# Patient Record
Sex: MALE | Race: WHITE | Employment: UNEMPLOYED | URBAN - METROPOLITAN AREA
[De-identification: names, ages, dates, MRNs, and addresses within clinical notes are randomized per-mention and may not be internally consistent; named-entity substitution may affect disease eponyms.]

---

## 2020-10-14 ENCOUNTER — ANESTHESIA EVENT (OUTPATIENT)
Dept: MEDSURG UNIT | Age: 40
End: 2020-10-14
Payer: SELF-PAY

## 2020-10-15 ENCOUNTER — TRANSCRIBE ORDER (OUTPATIENT)
Dept: REGISTRATION | Age: 40
End: 2020-10-15

## 2020-10-15 ENCOUNTER — HOSPITAL ENCOUNTER (OUTPATIENT)
Dept: PREADMISSION TESTING | Age: 40
Discharge: HOME | End: 2020-10-15
Payer: SELF-PAY

## 2020-10-15 DIAGNOSIS — Z01.812 PRE-PROCEDURE LAB EXAM: Primary | ICD-10-CM

## 2020-10-15 DIAGNOSIS — Z01.812 PRE-PROCEDURE LAB EXAM: ICD-10-CM

## 2020-10-15 PROCEDURE — 87635 SARS-COV-2 COVID-19 AMP PRB: CPT

## 2020-10-16 LAB — SARS-COV-2, COV2NT: NOT DETECTED

## 2020-10-19 ENCOUNTER — HOSPITAL ENCOUNTER (OUTPATIENT)
Age: 40
Setting detail: OUTPATIENT SURGERY
Discharge: HOME | End: 2020-10-19
Attending: PLASTIC SURGERY | Admitting: PLASTIC SURGERY
Payer: SELF-PAY

## 2020-10-19 ENCOUNTER — ANESTHESIA (OUTPATIENT)
Dept: MEDSURG UNIT | Age: 40
End: 2020-10-19
Payer: SELF-PAY

## 2020-10-19 VITALS
HEART RATE: 54 BPM | RESPIRATION RATE: 14 BRPM | OXYGEN SATURATION: 94 % | WEIGHT: 197 LBS | TEMPERATURE: 97.6 F | SYSTOLIC BLOOD PRESSURE: 124 MMHG | DIASTOLIC BLOOD PRESSURE: 64 MMHG | BODY MASS INDEX: 28.2 KG/M2 | HEIGHT: 70 IN

## 2020-10-19 PROCEDURE — 74011250637 HC RX REV CODE- 250/637: Performed by: ANESTHESIOLOGY

## 2020-10-19 PROCEDURE — 76030000006 HC AMB SURG OR TIME 3 TO 3.5: Performed by: PLASTIC SURGERY

## 2020-10-19 PROCEDURE — 77030008684 HC TU ET CUF COVD -B: Performed by: ANESTHESIOLOGY

## 2020-10-19 PROCEDURE — 76060000066 HC AMB SURG ANES 3 TO 3.5 HR: Performed by: PLASTIC SURGERY

## 2020-10-19 PROCEDURE — 77030040922 HC BLNKT HYPOTHRM STRY -A

## 2020-10-19 PROCEDURE — 2709999900 HC NON-CHARGEABLE SUPPLY

## 2020-10-19 PROCEDURE — 77030002916 HC SUT ETHLN J&J -A: Performed by: PLASTIC SURGERY

## 2020-10-19 PROCEDURE — 74011250636 HC RX REV CODE- 250/636: Performed by: PLASTIC SURGERY

## 2020-10-19 PROCEDURE — 77030040361 HC SLV COMPR DVT MDII -B: Performed by: PLASTIC SURGERY

## 2020-10-19 PROCEDURE — 77030018836 HC SOL IRR NACL ICUM -A: Performed by: PLASTIC SURGERY

## 2020-10-19 PROCEDURE — 74011000258 HC RX REV CODE- 258: Performed by: NURSE ANESTHETIST, CERTIFIED REGISTERED

## 2020-10-19 PROCEDURE — 74011250636 HC RX REV CODE- 250/636: Performed by: NURSE ANESTHETIST, CERTIFIED REGISTERED

## 2020-10-19 PROCEDURE — 77030040830 HC CATH URETH FOL MDII -A: Performed by: PLASTIC SURGERY

## 2020-10-19 PROCEDURE — 76210000036 HC AMBSU PH I REC 1.5 TO 2 HR: Performed by: PLASTIC SURGERY

## 2020-10-19 PROCEDURE — 77030008538 HC TBNG LIPO SUC WELL -B: Performed by: PLASTIC SURGERY

## 2020-10-19 PROCEDURE — 77030008534 HC TBNG LIPOSUC BYRO -B: Performed by: PLASTIC SURGERY

## 2020-10-19 PROCEDURE — 74011000250 HC RX REV CODE- 250: Performed by: PLASTIC SURGERY

## 2020-10-19 PROCEDURE — 77030026438 HC STYL ET INTUB CARD -A: Performed by: ANESTHESIOLOGY

## 2020-10-19 PROCEDURE — 74011250636 HC RX REV CODE- 250/636: Performed by: ANESTHESIOLOGY

## 2020-10-19 PROCEDURE — 2709999900 HC NON-CHARGEABLE SUPPLY: Performed by: PLASTIC SURGERY

## 2020-10-19 PROCEDURE — 74011000250 HC RX REV CODE- 250: Performed by: NURSE ANESTHETIST, CERTIFIED REGISTERED

## 2020-10-19 PROCEDURE — 77030012406 HC DRN WND PENRS BARD -A: Performed by: PLASTIC SURGERY

## 2020-10-19 RX ORDER — SCOLOPAMINE TRANSDERMAL SYSTEM 1 MG/1
1 PATCH, EXTENDED RELEASE TRANSDERMAL ONCE
Status: DISCONTINUED | OUTPATIENT
Start: 2020-10-19 | End: 2020-10-19 | Stop reason: HOSPADM

## 2020-10-19 RX ORDER — NEBIVOLOL 2.5 MG/1
2.5 TABLET ORAL DAILY
COMMUNITY

## 2020-10-19 RX ORDER — PROPOFOL 10 MG/ML
INJECTION, EMULSION INTRAVENOUS AS NEEDED
Status: DISCONTINUED | OUTPATIENT
Start: 2020-10-19 | End: 2020-10-19 | Stop reason: HOSPADM

## 2020-10-19 RX ORDER — FENTANYL CITRATE 50 UG/ML
INJECTION, SOLUTION INTRAMUSCULAR; INTRAVENOUS AS NEEDED
Status: DISCONTINUED | OUTPATIENT
Start: 2020-10-19 | End: 2020-10-19 | Stop reason: HOSPADM

## 2020-10-19 RX ORDER — OXYCODONE HYDROCHLORIDE 5 MG/1
5 TABLET ORAL AS NEEDED
Status: DISCONTINUED | OUTPATIENT
Start: 2020-10-19 | End: 2020-10-19 | Stop reason: HOSPADM

## 2020-10-19 RX ORDER — DIPHENHYDRAMINE HYDROCHLORIDE 50 MG/ML
12.5 INJECTION, SOLUTION INTRAMUSCULAR; INTRAVENOUS AS NEEDED
Status: DISCONTINUED | OUTPATIENT
Start: 2020-10-19 | End: 2020-10-19 | Stop reason: HOSPADM

## 2020-10-19 RX ORDER — SODIUM CHLORIDE, SODIUM LACTATE, POTASSIUM CHLORIDE, CALCIUM CHLORIDE 600; 310; 30; 20 MG/100ML; MG/100ML; MG/100ML; MG/100ML
100 INJECTION, SOLUTION INTRAVENOUS CONTINUOUS
Status: DISCONTINUED | OUTPATIENT
Start: 2020-10-19 | End: 2020-10-19 | Stop reason: HOSPADM

## 2020-10-19 RX ORDER — DEXAMETHASONE SODIUM PHOSPHATE 4 MG/ML
INJECTION, SOLUTION INTRA-ARTICULAR; INTRALESIONAL; INTRAMUSCULAR; INTRAVENOUS; SOFT TISSUE AS NEEDED
Status: DISCONTINUED | OUTPATIENT
Start: 2020-10-19 | End: 2020-10-19 | Stop reason: HOSPADM

## 2020-10-19 RX ORDER — ROCURONIUM BROMIDE 10 MG/ML
INJECTION, SOLUTION INTRAVENOUS AS NEEDED
Status: DISCONTINUED | OUTPATIENT
Start: 2020-10-19 | End: 2020-10-19 | Stop reason: HOSPADM

## 2020-10-19 RX ORDER — MIDAZOLAM HYDROCHLORIDE 1 MG/ML
1 INJECTION, SOLUTION INTRAMUSCULAR; INTRAVENOUS AS NEEDED
Status: DISCONTINUED | OUTPATIENT
Start: 2020-10-19 | End: 2020-10-19 | Stop reason: HOSPADM

## 2020-10-19 RX ORDER — SODIUM CHLORIDE 0.9 % (FLUSH) 0.9 %
5-40 SYRINGE (ML) INJECTION EVERY 8 HOURS
Status: DISCONTINUED | OUTPATIENT
Start: 2020-10-19 | End: 2020-10-19 | Stop reason: HOSPADM

## 2020-10-19 RX ORDER — MIDAZOLAM HYDROCHLORIDE 1 MG/ML
INJECTION, SOLUTION INTRAMUSCULAR; INTRAVENOUS AS NEEDED
Status: DISCONTINUED | OUTPATIENT
Start: 2020-10-19 | End: 2020-10-19 | Stop reason: HOSPADM

## 2020-10-19 RX ORDER — ONDANSETRON 2 MG/ML
INJECTION INTRAMUSCULAR; INTRAVENOUS AS NEEDED
Status: DISCONTINUED | OUTPATIENT
Start: 2020-10-19 | End: 2020-10-19 | Stop reason: HOSPADM

## 2020-10-19 RX ORDER — LIDOCAINE HYDROCHLORIDE 20 MG/ML
INJECTION, SOLUTION EPIDURAL; INFILTRATION; INTRACAUDAL; PERINEURAL AS NEEDED
Status: DISCONTINUED | OUTPATIENT
Start: 2020-10-19 | End: 2020-10-19 | Stop reason: HOSPADM

## 2020-10-19 RX ORDER — MORPHINE SULFATE 10 MG/ML
2 INJECTION, SOLUTION INTRAMUSCULAR; INTRAVENOUS
Status: DISCONTINUED | OUTPATIENT
Start: 2020-10-19 | End: 2020-10-19 | Stop reason: HOSPADM

## 2020-10-19 RX ORDER — FENTANYL CITRATE 50 UG/ML
50 INJECTION, SOLUTION INTRAMUSCULAR; INTRAVENOUS AS NEEDED
Status: DISCONTINUED | OUTPATIENT
Start: 2020-10-19 | End: 2020-10-19 | Stop reason: HOSPADM

## 2020-10-19 RX ORDER — SODIUM CHLORIDE, SODIUM LACTATE, POTASSIUM CHLORIDE, CALCIUM CHLORIDE 600; 310; 30; 20 MG/100ML; MG/100ML; MG/100ML; MG/100ML
25 INJECTION, SOLUTION INTRAVENOUS CONTINUOUS
Status: DISCONTINUED | OUTPATIENT
Start: 2020-10-19 | End: 2020-10-19 | Stop reason: HOSPADM

## 2020-10-19 RX ORDER — SUCCINYLCHOLINE CHLORIDE 20 MG/ML
INJECTION INTRAMUSCULAR; INTRAVENOUS AS NEEDED
Status: DISCONTINUED | OUTPATIENT
Start: 2020-10-19 | End: 2020-10-19 | Stop reason: HOSPADM

## 2020-10-19 RX ORDER — LIDOCAINE HYDROCHLORIDE 10 MG/ML
0.1 INJECTION, SOLUTION EPIDURAL; INFILTRATION; INTRACAUDAL; PERINEURAL AS NEEDED
Status: DISCONTINUED | OUTPATIENT
Start: 2020-10-19 | End: 2020-10-19 | Stop reason: HOSPADM

## 2020-10-19 RX ORDER — HYDROMORPHONE HYDROCHLORIDE 1 MG/ML
0.2 INJECTION, SOLUTION INTRAMUSCULAR; INTRAVENOUS; SUBCUTANEOUS
Status: DISCONTINUED | OUTPATIENT
Start: 2020-10-19 | End: 2020-10-19 | Stop reason: HOSPADM

## 2020-10-19 RX ORDER — CEFAZOLIN SODIUM/WATER 2 G/20 ML
2 SYRINGE (ML) INTRAVENOUS ONCE
Status: DISCONTINUED | OUTPATIENT
Start: 2020-10-19 | End: 2020-10-19 | Stop reason: SDUPTHER

## 2020-10-19 RX ORDER — MIDAZOLAM HYDROCHLORIDE 1 MG/ML
0.5 INJECTION, SOLUTION INTRAMUSCULAR; INTRAVENOUS
Status: DISCONTINUED | OUTPATIENT
Start: 2020-10-19 | End: 2020-10-19 | Stop reason: HOSPADM

## 2020-10-19 RX ORDER — ONDANSETRON 2 MG/ML
4 INJECTION INTRAMUSCULAR; INTRAVENOUS AS NEEDED
Status: DISCONTINUED | OUTPATIENT
Start: 2020-10-19 | End: 2020-10-19 | Stop reason: HOSPADM

## 2020-10-19 RX ORDER — LISINOPRIL 5 MG/1
5 TABLET ORAL DAILY
COMMUNITY

## 2020-10-19 RX ORDER — SODIUM CHLORIDE 0.9 % (FLUSH) 0.9 %
5-40 SYRINGE (ML) INJECTION AS NEEDED
Status: DISCONTINUED | OUTPATIENT
Start: 2020-10-19 | End: 2020-10-19 | Stop reason: HOSPADM

## 2020-10-19 RX ORDER — CEFAZOLIN SODIUM/WATER 2 G/20 ML
2 SYRINGE (ML) INTRAVENOUS
Status: COMPLETED | OUTPATIENT
Start: 2020-10-19 | End: 2020-10-19

## 2020-10-19 RX ORDER — CEFAZOLIN SODIUM 1 G/3ML
2 INJECTION, POWDER, FOR SOLUTION INTRAMUSCULAR; INTRAVENOUS
Status: DISCONTINUED | OUTPATIENT
Start: 2020-10-19 | End: 2020-10-19 | Stop reason: SDUPTHER

## 2020-10-19 RX ORDER — ROPIVACAINE HYDROCHLORIDE 5 MG/ML
30 INJECTION, SOLUTION EPIDURAL; INFILTRATION; PERINEURAL AS NEEDED
Status: DISCONTINUED | OUTPATIENT
Start: 2020-10-19 | End: 2020-10-19 | Stop reason: HOSPADM

## 2020-10-19 RX ORDER — SODIUM CHLORIDE 9 MG/ML
25 INJECTION, SOLUTION INTRAVENOUS CONTINUOUS
Status: DISCONTINUED | OUTPATIENT
Start: 2020-10-19 | End: 2020-10-19 | Stop reason: HOSPADM

## 2020-10-19 RX ORDER — FENTANYL CITRATE 50 UG/ML
25 INJECTION, SOLUTION INTRAMUSCULAR; INTRAVENOUS
Status: DISCONTINUED | OUTPATIENT
Start: 2020-10-19 | End: 2020-10-19 | Stop reason: HOSPADM

## 2020-10-19 RX ORDER — ACETAMINOPHEN 325 MG/1
650 TABLET ORAL ONCE
Status: COMPLETED | OUTPATIENT
Start: 2020-10-19 | End: 2020-10-19

## 2020-10-19 RX ORDER — BUPROPION HYDROCHLORIDE 300 MG/1
300 TABLET ORAL
COMMUNITY

## 2020-10-19 RX ADMIN — SODIUM CHLORIDE, POTASSIUM CHLORIDE, SODIUM LACTATE AND CALCIUM CHLORIDE: 600; 310; 30; 20 INJECTION, SOLUTION INTRAVENOUS at 14:08

## 2020-10-19 RX ADMIN — PROPOFOL 250 MG: 10 INJECTION, EMULSION INTRAVENOUS at 12:35

## 2020-10-19 RX ADMIN — LIDOCAINE HYDROCHLORIDE 60 MG: 20 INJECTION, SOLUTION EPIDURAL; INFILTRATION; INTRACAUDAL; PERINEURAL at 12:35

## 2020-10-19 RX ADMIN — SODIUM CHLORIDE, POTASSIUM CHLORIDE, SODIUM LACTATE AND CALCIUM CHLORIDE: 600; 310; 30; 20 INJECTION, SOLUTION INTRAVENOUS at 12:13

## 2020-10-19 RX ADMIN — FENTANYL CITRATE 50 MCG: 50 INJECTION, SOLUTION INTRAMUSCULAR; INTRAVENOUS at 12:35

## 2020-10-19 RX ADMIN — PROPOFOL 50 MG: 10 INJECTION, EMULSION INTRAVENOUS at 13:54

## 2020-10-19 RX ADMIN — SUCCINYLCHOLINE CHLORIDE 160 MG: 20 INJECTION, SOLUTION INTRAMUSCULAR; INTRAVENOUS at 12:35

## 2020-10-19 RX ADMIN — ACETAMINOPHEN 650 MG: 325 TABLET ORAL at 11:28

## 2020-10-19 RX ADMIN — DEXMEDETOMIDINE HYDROCHLORIDE 4 MCG: 100 INJECTION, SOLUTION, CONCENTRATE INTRAVENOUS at 15:08

## 2020-10-19 RX ADMIN — MIDAZOLAM 2 MG: 1 INJECTION INTRAMUSCULAR; INTRAVENOUS at 12:28

## 2020-10-19 RX ADMIN — FENTANYL CITRATE 25 MCG: 50 INJECTION, SOLUTION INTRAMUSCULAR; INTRAVENOUS at 15:36

## 2020-10-19 RX ADMIN — Medication 2 G: at 12:42

## 2020-10-19 RX ADMIN — ONDANSETRON HYDROCHLORIDE 4 MG: 2 INJECTION, SOLUTION INTRAMUSCULAR; INTRAVENOUS at 14:57

## 2020-10-19 RX ADMIN — DEXAMETHASONE SODIUM PHOSPHATE 8 MG: 4 INJECTION, SOLUTION INTRAMUSCULAR; INTRAVENOUS at 12:40

## 2020-10-19 RX ADMIN — FENTANYL CITRATE 25 MCG: 50 INJECTION, SOLUTION INTRAMUSCULAR; INTRAVENOUS at 15:18

## 2020-10-19 RX ADMIN — PROPOFOL 100 MG: 10 INJECTION, EMULSION INTRAVENOUS at 12:55

## 2020-10-19 RX ADMIN — PROPOFOL 50 MG: 10 INJECTION, EMULSION INTRAVENOUS at 12:43

## 2020-10-19 RX ADMIN — ROCURONIUM BROMIDE 5 MG: 10 SOLUTION INTRAVENOUS at 12:35

## 2020-10-19 NOTE — ROUTINE PROCESS
Patient: Alessio Hatfield MRN: [id number]  SSN: [id number] YOB: 1980  Age: 47 y.o. Sex: male Patient is status post Procedure(s): LIPOSUCTION WITH JPLASMA OF ABDOMEN. Surgeon(s) and Role: Juan Brown MD - Primary Local/Dose/Irrigation:   
 
                              
 
 
 
Dressing/Packing:  Wound Abdomen-Dressing Type: (XEROFORM 4X4 ABD TAPE BINDER) (10/19/20 1100) Splint/Cast:  ] Other:

## 2020-10-19 NOTE — BRIEF OP NOTE
Brief Postoperative Note    Patient: Pattie Salcedo  YOB: 1980  MRN: [id number]    Date of Procedure: 10/19/2020     Pre-Op Diagnosis: COSMETIC    Post-Op Diagnosis: Same as preoperative diagnosis.       Procedure(s):  LIPOSUCTION WITH JPLASMA OF ABDOMEN AND FLANK    Surgeon(s):  Colette Victor MD    Surgical Assistant: None    Anesthesia: General     Estimated Blood Loss (mL): less than 499     Complications: None    Specimens: * No specimens in log *     Implants: * No implants in log *    Drains: * No LDAs found *    Findings: none    Tumescent: 4L  Lipoaspirate:  2.5L (posterior Flanks 750 ml, anterior Flanks 750, abdomen 1000 ml)  Renuvion: 70%/3L 9.5 KJ    Electronically Signed by William Robertson MD on 10/19/2020 at 3:21 PM

## 2020-10-19 NOTE — H&P
Pre-op History and Physical    CC: COSMETIC   HPI: 47y.o. year old male with COSMETIC deformiy presents  for Procedure(s):  LIPOSUCTION WITH JPLASMA OF ABDOMEN. Please see clinic [facility] for full details. Past medical history:   Past Medical History:   Diagnosis Date    Aneurysm ([address]     ascending aortic aneurysm      Past surgical history:   Past Surgical History:   Procedure Laterality Date    HX WISDOM TEETH EXTRACTION      14years old       Family history: No family history on file. Social history:   Social History     Socioeconomic History    Marital status: SINGLE     Spouse name: Not on file    Number of children: Not on file    Years of education: Not on file    Highest education level: Not on file   Occupational History    Not on file   Social Needs    Financial resource strain: Not on file    Food insecurity     Worry: Not on file     Inability: Not on file    Transportation needs     Medical: Not on file     Non-medical: Not on file   Tobacco Use    Smoking status: Not on file   Substance and Sexual Activity    Alcohol use: Not on file    Drug use: Not on file    Sexual activity: Not on file   Lifestyle    Physical activity     Days per week: Not on file     Minutes per session: Not on file    Stress: Not on file   Relationships    Social connections     Talks on phone: Not on file     Gets together: Not on file     Attends Protestant service: Not on file     Active member of club or organization: Not on file     Attends meetings of clubs or organizations: Not on file     Relationship status: Not on file    Intimate partner violence     Fear of current or ex partner: Not on file     Emotionally abused: Not on file     Physically abused: Not on file     Forced sexual activity: Not on file   Other Topics Concern    Not on file   Social History Narrative    Not on file      Home Medications:   Prior to Admission medications    Medication Sig Start Date End Date Taking?  Authorizing Provider   lisinopriL (PRINIVIL, ZESTRIL) 5 mg tablet Take 5 mg by mouth daily. Yes Provider, Historical   nebivoloL (Bystolic) 2.5 mg tablet Take 2.5 mg by mouth daily. Yes Provider, Historical   buPROPion XL (Wellbutrin XL) 300 mg XL tablet Take 300 mg by mouth every morning. Yes Provider, Historical      Allergies: No Known Allergies   Review of systems:  Denies headache, fever, chills, weight change, congestion, sore throat, chest pain, shortness of breath, nausea, vomiting, diarrhea, constipation, abdominal pain, generalized weakness, muscle or joint pain, and rash. Physical Exam:  Vitals: Blood pressure 114/85, pulse (!) 59, temperature 98.6 °F (37 °C), resp. rate 20, height 5' 10\" (1.778 m), weight 89.4 kg (197 lb), SpO2 95 %. General: awake and alert, NAD  Neck: supple  Cor: RRR  Lungs: clear  Abdomen: soft, non-tender, non-distended, no hernia, benign lipodystrophy  Extremities: no edema  Breast:  Skin: lipodystrophy  HEENT:      Impression: COSMETIC    Plan:  Procedure(s):  LIPOSUCTION WITH JPLASMA OF ABDOMEN  . The patient was counseled at length about the risks of sheldon Covid-19 during their perioperative period and any recovery window from their procedure. The patient was made aware that sheldon Covid-19  may worsen their prognosis for recovering from their procedure and lend to a higher morbidity and/or mortality risk. All material risks, benefits, and reasonable alternatives including postponing the procedure were discussed. The patient does  wish to proceed with the procedure at this time.

## 2020-10-19 NOTE — DISCHARGE INSTRUCTIONS
DISCHARGE INSTRUCTIONS     Follow-up with Dr. Beto Martinez in 10 days. Call  [phone]    Remove dressing(s) in 1 days . May Shower (Do not take Shields Roxo)  In  1 days     Activity:     No strenous activity, No lifting greater then 10 lbs    Call for: fever, chills, vomiting, foul smelling drainage, bleeding, difficulty breathing, leg cramps  [phone]       Please call [facility] [phone]  to make arrangements from PACU. Quick Reference Instructions for After Liposuction      What to Expect:  o Drainage:   -  Its not uncommon to have a significant amount of light red or yellow color drainage the night following surgery and into the next day. -   I recommend sleeping on a towel or jonathan protectors so as protect your bedding.   - Change gauze as needed, may also use maxi pads   o Pain:    - The local anesthetic medicine will most likely wear off about 1-2 hrs after surgery.     - You should take some oral pain medication (dilaudid, Percocet, hydromorphine, oxycondeine) when you get home and before going to bed. Follow the instructions on the bottle. o Swelling:  - Expect to feel swollen, distended and bruised following surgery.  - You will see a change in the area immediately after surgery and the next day following removal of your garment to shower. However, dont be alarmed if the area swells further the following day and even may be assymetric. This is all part of the normal healing process. Most irregularities and swelling will resolve over the following 1-3 months.   - ICE/HEAT:  I do not recommend any ice therapy initially post surgery. You should also AVOID  heating pads as the skin is somewhat numb and can be burned. o Bruising:    - Its not uncommon to have bruising even extending into the genital region. There should not be any very large tense bruises isolated to one area.   If you are concerned called Dr. Jin Christian:  o There are No food restrictions after anesthesia. However, I would recommend eating small bland meals at first and drinking plenty of fluids. o Avoid alcohol for the first 3 days post surgery and while you are taking the pain medications or valium     No Smoking or Nicotine products for 2 weeks post liposuction     Dressing Changes/Wound Care:  o Remove binder and dressings the morning after surgery  o You may use the adhesive lotion remover to remove the surgical prep  o You may gently shower  o Apply Antibiotic ointment to the incisions twice daily  o Cover with 4x4 gauze & paper tape  or Bandaids     Garment/Compression:  o You should keep the garment on the first night of surgery and wear the garment around 22 hours a day.    o It should be snug and comforting, not too tight, not too loose  o You may wear a clean cotton t-shirt under the garment, this helps prevent irritation  o If you notice the garment causing particular creases or pressure on one area of the skin, try to readjust the garment to allow for an even distribution of pressure     Showering:  o You may shower 24 hrs after the surgery  o Do not submerge in water. Do not take a bath or swim until instructed by Dr. Vianey Vernon. Typically once sutures are removed.  Activity  o Do not perform any strenuous activity or heavy lifting (greater than 10 lbs) for 2 weeks post surgery. Anything that raises your blood pressure can increase the risk for bleeding.  o Exercise:  you may begin walking anytime after your surgery, light jogging the following week. But you should refrain from serious exercise until cleared by Dr. Vianey Vernon (typically 2-3 weeks)     Driving: You may not drive while on pain medication or valium. You should feel good enough that you are confident you can control the vehicle and slam on the brakes if needed.  Medication  o Antibiotics:  - Oral Antibiotics: typically Keflex/ciprofloxin/cephalexin is prescribed.   Take these the night of surgery and continue as instructed on the bottle until the prescription is completed. - Topical Antibiotic:  Either a prescription for bactroban/mupirocin will be written or over the counter Bacitracinshould be applied twice daily to the incisions. o Dilaudid/Hydromorphine/oxycodone/Percocet:   One of these will be prescribe as your pain medication. Typically you can take 1-2 pills every 4-6hrs as needed. See instructions on bottle   o Valium/Diazepam:  may also be prescribed, this medication helps with muscle pain, anxiety and sleep. Some patients find it helps a lot with pain as well. See instructions on the bottle, but usually you may take this every 6 hrs as need. Be careful taking this simultaneously with the pain medication. Initially space out taking the pain medication and valium by about one hour.    o Tylenol:  you may take Tylenol if you are prescribed dilaudid/hydromorphine. Do not exceed 3250 mg of Tylenol in a day and do not take it if prescribed Percocet/oxycodone/hydrocodone   o Ibuprofen/aleve/naproxen/aspirin/advil:  Donot take, until 2 weeks after surgery  o Nausea/Vomiting: This is not typical after awake surgery, but is common after general anesthesia. If you experience this Zofran or Phenergan can be prescribed  o Stool Softener: It is recommended you take a stool softener as the pain medication can cause constipation. I recommend either Senna-S or Colace, two tablets twice daily      Follow Up:  o Typically follow up is around 7-10 days post liposuction  o You can call the office to arrange an appointment     Urgent Issues of Concern:After Hours Paging  [phone]    o For Urgent issues contact Dr. Harsha Crook and [phone] if you believe its a true emergency. o Call with any questions. During the day you can reach us at our office number or email during the day   - [place]:         [phone]  [email or web address]  [employer] [place]:  [phone]  [email or web address]. [employer]  o Urgent Issues: - Fever above 101.5  - Large amounts of bright red blood (more than one gauze pad)  - Difficult Breathing, Chest Pain, Shortness of Breath  - Increased Swelling in the Legs  - Calf or Leg Pain   - Redness or Pus at the Surgical Site

## 2020-10-19 NOTE — ANESTHESIA PREPROCEDURE EVALUATION
Relevant Problems   No relevant active problems       Anesthetic History   No history of anesthetic complications            Review of Systems / Medical History  Patient summary reviewed, nursing notes reviewed and pertinent labs reviewed    Pulmonary  Within defined limits                 Neuro/Psych   Within defined limits           Cardiovascular                  Exercise tolerance: >4 METS  Comments: Bicuspid AV with 4.7 cm ascending aneurysm   GI/Hepatic/Renal  Within defined limits              Endo/Other  Within defined limits           Other Findings              Physical Exam    Airway  Mallampati: II  TM Distance: 4 - 6 cm  Neck ROM: normal range of motion   Mouth opening: Normal     Cardiovascular  Regular rate and rhythm,  S1 and S2 normal,  no murmur, click, rub, or gallop             Dental  No notable dental hx       Pulmonary  Breath sounds clear to auscultation               Abdominal  GI exam deferred       Other Findings            Anesthetic Plan    ASA: 2  Anesthesia type: general          Induction: Intravenous  Anesthetic plan and risks discussed with: Patient

## 2020-10-20 NOTE — ANESTHESIA POSTPROCEDURE EVALUATION
Post-Anesthesia Evaluation and Assessment    Patient: Melida Lauren MRN: [id number]  SSN: [id number]    YOB: 1980  Age: 47 y.o. Sex: male      I have evaluated the patient and they are stable and ready for discharge from the PACU. Cardiovascular Function/Vital Signs  Visit Vitals  /64   Pulse (!) 54   Temp 36.4 °C (97.6 °F)   Resp 14   Ht 5' 10\" (1.778 m)   Wt 89.4 kg (197 lb)   SpO2 94%   BMI 28.27 kg/m²       Patient is status post General anesthesia for Procedure(s):  LIPOSUCTION WITH JPLASMA OF ABDOMEN AND FLANK. Nausea/Vomiting: None    Postoperative hydration reviewed and adequate. Pain:  Pain Scale 1: Numeric (0 - 10) (10/19/20 1700)  Pain Intensity 1: 0 (10/19/20 1700)   Managed    Neurological Status:   Neuro (WDL): Within Defined Limits (10/19/20 1700)  Neuro  LUE Motor Response: Spontaneous  (10/19/20 1533)  LLE Motor Response: Spontaneous  (10/19/20 1533)  RUE Motor Response: Spontaneous  (10/19/20 1533)  RLE Motor Response: Spontaneous  (10/19/20 1533)   At baseline    Mental Status, Level of Consciousness: Alert and  oriented to person, place, and time    Pulmonary Status:   O2 Device: Room air (10/19/20 1700)   Adequate oxygenation and airway patent    Complications related to anesthesia: None    Post-anesthesia assessment completed. No concerns    Signed By: Viviana Davis MD     October 20, 2020              Procedure(s):  LIPOSUCTION WITH JPLASMA OF ABDOMEN AND FLANK. general    <BSHSIANPOST>    INITIAL Post-op Vital signs:   Vitals Value Taken Time   /64 10/19/2020  5:00 PM   Temp 36.4 °C (97.6 °F) 10/19/2020  3:41 PM   Pulse 55 10/19/2020  5:02 PM   Resp 16 10/19/2020  5:02 PM   SpO2 96 % 10/19/2020  5:02 PM   Vitals shown include unvalidated device data.

## 2020-10-20 NOTE — OP NOTES
[facility]  OPERATIVE REPORT    Name:  Ely Catherine  MR#:  [id number]  :  1980  ACCOUNT #:  [de-identified]  DATE OF SERVICE:  10/19/2020      LOCATION:  [facility]. SERVICE:  Plastic Surgery. PREOPERATIVE DIAGNOSIS:  Lipodystrophy of abdomen and flanks. POSTOPERATIVE DIAGNOSIS:  Lipodystrophy of abdomen and flanks. PROCEDURE PERFORMED:  Liposuction of abdomen and flanks. SURGEON:  Yanci Kwon MD    ASSISTANT:  None. ANESTHESIA:  General.    COMPLICATIONS:  None. SPECIMENS REMOVED:  None. IMPLANTS:  None. ESTIMATED BLOOD LOSS:  Less than 100 mL. DRAINS:  None. RUIZ:  Discontinued at the end of the case. TUMESCENT:  4 L. LIPOASPIRATE:  2.5 L. BRIEF INDICATIONS:  This is a 45-year-old male otherwise fairly healthy without significant medical problems or abnormality. Physical exam reveals lipodystrophy of the lower abdomen, love handles and flank area. Otherwise, benign abdomen and no hernias. Risks and benefits of liposuction with Renuvion were thoroughly discussed with the patient including but not limited to bleeding, infection, seroma, hematoma, PE, DVT, contour irregularity, damage to surrounding structures, perforation of viscera, death, risks of sheldon COVID-19 and subsequent sequelae. Areas of lipodystrophy were marked in the upright position in the upper and lower abdomen, love handles, flank area, and low back. OPERATIVE REPORT:  The patient underwent general anesthesia, was placed in the prone position. His lower back was prepped and draped in sterile surgical fashion. Through 2 stab incisions at the level of buttocks, tumescent solution was infiltrated throughout the bilateral low back. 1.2 L was used here. This was followed by lipoaspiration with 3 and 4-mm cannulas. A total lipoaspirate of 750 mL was obtained.   Post this, J-Plasma was used at settings of 70% power and 3 L of flow throughout the case.  Four passes were performed subcutaneously on the low back for a total of 3.6 kilojoules of energy. Incision was closed with 4-0 nylon sutures. The patient was flipped into supine position. Abdomen was prepped and draped in sterile surgical fashion. Upper and lower abdomen, love handles, and flank area were tumesced once again through stab incisions in the groin and umbilicus, bringing total tumescent volume to 4 L. Once again, lipoaspiration with 3 and 4-mm cannulas was performed of the flank, love handle areas, upper and lower abdomen. Approximately, additional 750 mL was obtained from the anterior flank. From the lower abdomen approximately another liter was obtained. This brought the total lipoaspirate to 2.5 L. Once again, J-Plasma was performed subcutaneously with 4 passes in all areas. Post J-plasma, additional lipoaspiration was performed. Total J-plasma energy was 9.5 kilojoules. Incision was closed with 4-0 nylon sutures, placed in a sterile dressing, binder, and transported to the PACU for further recovery.         Edmond Mcgee MD      [id number]  D:  10/19/2020 15:27  T:  10/19/2020 22:34  JOB #:  [id number]

## 2023-05-15 RX ORDER — LISINOPRIL 5 MG/1
5 TABLET ORAL DAILY
COMMUNITY

## 2023-05-15 RX ORDER — NEBIVOLOL 2.5 MG/1
2.5 TABLET ORAL DAILY
COMMUNITY

## 2023-05-15 RX ORDER — BUPROPION HYDROCHLORIDE 300 MG/1
300 TABLET ORAL EVERY MORNING
COMMUNITY

## (undated) DEVICE — Z CONVERTED USE 2271116 TUBING ASPIR 9 FT STRL ULTRA-LIMP

## (undated) DEVICE — SOLUTION IV 1000ML 0.9% SOD CHL

## (undated) DEVICE — SYRINGE IRRIG 60ML SFT PLIABLE BLB EZ TO GRP 1 HND USE W/

## (undated) DEVICE — NEEDLE HYPO 25GA L1.5IN BVL ORIENTED ECLIPSE

## (undated) DEVICE — GARMENT,MEDLINE,DVT,INT,CALF,MED, GEN2: Brand: MEDLINE

## (undated) DEVICE — SYR 10ML LUER LOK 1/5ML GRAD --

## (undated) DEVICE — SPONGE LAP 18X18IN STRL -- 5/PK

## (undated) DEVICE — DRAIN WND PENRS RADPQ 0.25X12 --

## (undated) DEVICE — 3M™ TEGADERM™ TRANSPARENT FILM DRESSING FRAME STYLE, 1626W, 4 IN X 4-3/4 IN (10 CM X 12 CM), 50/CT 4CT/CASE: Brand: 3M™ TEGADERM™

## (undated) DEVICE — REM POLYHESIVE ADULT PATIENT RETURN ELECTRODE: Brand: VALLEYLAB

## (undated) DEVICE — TOTAL TRAY, DB, 100% SILI FOLEY, 16FR 10: Brand: MEDLINE

## (undated) DEVICE — RINGERFTS IV SOL

## (undated) DEVICE — TRAY PREP DRY W/ PREM GLV 2 APPL 6 SPNG 2 UNDPD 1 OVERWRAP

## (undated) DEVICE — SUT ETHLN 4-0 18IN PS2 BLK --

## (undated) DEVICE — PACK,BASIC,SIRUS,V: Brand: MEDLINE

## (undated) DEVICE — PACK,ORTOHMAX/CVMAX,UNIVERSAL,5/CS: Brand: MEDLINE

## (undated) DEVICE — UNDERPAD INCON STD 36X23IN --

## (undated) DEVICE — PAD,ABDOMINAL,5"X9",ST,LF,25/BX: Brand: MEDLINE INDUSTRIES, INC.

## (undated) DEVICE — SYR 50ML LR LCK 1ML GRAD NSAF --

## (undated) DEVICE — HANDLE LT SNAP ON ULT DURABLE LENS FOR TRUMPF ALC DISPOSABLE

## (undated) DEVICE — INFECTION CONTROL KIT SYS

## (undated) DEVICE — SYR 3ML LL TIP 1/10ML GRAD --

## (undated) DEVICE — TOWEL SURG W17XL27IN STD BLU COT NONFENESTRATED PREWASHED

## (undated) DEVICE — Device

## (undated) DEVICE — TUBING SUCT L9FT FOR AUTOFUSE INFLTR SYS

## (undated) DEVICE — SPONGE GZ W4XL4IN COT 12 PLY TYP VII WVN C FLD DSGN